# Patient Record
Sex: FEMALE | Race: WHITE | Employment: STUDENT | URBAN - METROPOLITAN AREA
[De-identification: names, ages, dates, MRNs, and addresses within clinical notes are randomized per-mention and may not be internally consistent; named-entity substitution may affect disease eponyms.]

---

## 2020-02-21 ENCOUNTER — HOSPITAL ENCOUNTER (EMERGENCY)
Age: 25
Discharge: HOME | End: 2020-02-21
Attending: EMERGENCY MEDICINE | Admitting: EMERGENCY MEDICINE
Payer: COMMERCIAL

## 2020-02-21 VITALS
WEIGHT: 181 LBS | HEIGHT: 67 IN | TEMPERATURE: 98.3 F | RESPIRATION RATE: 15 BRPM | BODY MASS INDEX: 28.41 KG/M2 | SYSTOLIC BLOOD PRESSURE: 111 MMHG | DIASTOLIC BLOOD PRESSURE: 84 MMHG | OXYGEN SATURATION: 99 % | HEART RATE: 118 BPM

## 2020-02-21 DIAGNOSIS — K52.9 GASTROENTERITIS, ACUTE: Primary | ICD-10-CM

## 2020-02-21 LAB
ANION GAP SERPL CALC-SCNC: 7 MMOL/L (ref 5–15)
BUN SERPL-MCNC: 14 MG/DL (ref 6–20)
BUN/CREAT SERPL: 16 (ref 12–20)
CALCIUM SERPL-MCNC: 9.5 MG/DL (ref 8.5–10.1)
CHLORIDE SERPL-SCNC: 108 MMOL/L (ref 97–108)
CO2 SERPL-SCNC: 22 MMOL/L (ref 21–32)
CREAT SERPL-MCNC: 0.9 MG/DL (ref 0.55–1.02)
GLUCOSE SERPL-MCNC: 115 MG/DL (ref 65–100)
HCG UR QL: NEGATIVE
POTASSIUM SERPL-SCNC: 4 MMOL/L (ref 3.5–5.1)
SODIUM SERPL-SCNC: 137 MMOL/L (ref 136–145)

## 2020-02-21 PROCEDURE — 96375 TX/PRO/DX INJ NEW DRUG ADDON: CPT

## 2020-02-21 PROCEDURE — 96361 HYDRATE IV INFUSION ADD-ON: CPT

## 2020-02-21 PROCEDURE — 96374 THER/PROPH/DIAG INJ IV PUSH: CPT

## 2020-02-21 PROCEDURE — 81025 URINE PREGNANCY TEST: CPT

## 2020-02-21 PROCEDURE — 99284 EMERGENCY DEPT VISIT MOD MDM: CPT

## 2020-02-21 PROCEDURE — 36415 COLL VENOUS BLD VENIPUNCTURE: CPT

## 2020-02-21 PROCEDURE — 80048 BASIC METABOLIC PNL TOTAL CA: CPT

## 2020-02-21 PROCEDURE — 74011250636 HC RX REV CODE- 250/636: Performed by: EMERGENCY MEDICINE

## 2020-02-21 RX ORDER — DICYCLOMINE HYDROCHLORIDE 10 MG/1
10 CAPSULE ORAL 4 TIMES DAILY
Qty: 20 CAP | Refills: 0 | Status: SHIPPED | OUTPATIENT
Start: 2020-02-21 | End: 2020-02-21

## 2020-02-21 RX ORDER — ONDANSETRON 4 MG/1
4 TABLET, FILM COATED ORAL
Qty: 20 TAB | Refills: 0 | Status: SHIPPED | OUTPATIENT
Start: 2020-02-21

## 2020-02-21 RX ORDER — ONDANSETRON 2 MG/ML
4 INJECTION INTRAMUSCULAR; INTRAVENOUS
Status: COMPLETED | OUTPATIENT
Start: 2020-02-21 | End: 2020-02-21

## 2020-02-21 RX ORDER — DICYCLOMINE HYDROCHLORIDE 10 MG/1
10 CAPSULE ORAL 4 TIMES DAILY
Qty: 20 CAP | Refills: 0 | Status: SHIPPED | OUTPATIENT
Start: 2020-02-21 | End: 2020-02-26

## 2020-02-21 RX ORDER — KETOROLAC TROMETHAMINE 30 MG/ML
15 INJECTION, SOLUTION INTRAMUSCULAR; INTRAVENOUS
Status: COMPLETED | OUTPATIENT
Start: 2020-02-21 | End: 2020-02-21

## 2020-02-21 RX ORDER — ONDANSETRON 4 MG/1
4 TABLET, FILM COATED ORAL
Qty: 20 TAB | Refills: 0 | Status: SHIPPED | OUTPATIENT
Start: 2020-02-21 | End: 2020-02-21

## 2020-02-21 RX ADMIN — ONDANSETRON 4 MG: 2 INJECTION INTRAMUSCULAR; INTRAVENOUS at 05:33

## 2020-02-21 RX ADMIN — KETOROLAC TROMETHAMINE 15 MG: 30 INJECTION, SOLUTION INTRAMUSCULAR at 05:55

## 2020-02-21 RX ADMIN — SODIUM CHLORIDE 1000 ML: 900 INJECTION, SOLUTION INTRAVENOUS at 05:31

## 2020-02-21 NOTE — ED NOTES
Pt discharged home with written and verbal instructions given by Dr. Griselda Marshall andpt ambulated out of ED without difficulty.

## 2020-02-21 NOTE — DISCHARGE INSTRUCTIONS
Patient Education        Gastroenteritis: Care Instructions  Your Care Instructions    Gastroenteritis is an illness that may cause nausea, vomiting, and diarrhea. It is sometimes called \"stomach flu. \" It can be caused by bacteria or a virus. You will probably begin to feel better in 1 to 2 days. In the meantime, get plenty of rest and make sure you do not become dehydrated. Dehydration occurs when your body loses too much fluid. Follow-up care is a key part of your treatment and safety. Be sure to make and go to all appointments, and call your doctor if you are having problems. It's also a good idea to know your test results and keep a list of the medicines you take. How can you care for yourself at home? · If your doctor prescribed antibiotics, take them as directed. Do not stop taking them just because you feel better. You need to take the full course of antibiotics. · Drink plenty of fluids to prevent dehydration, enough so that your urine is light yellow or clear like water. Choose water and other caffeine-free clear liquids until you feel better. If you have kidney, heart, or liver disease and have to limit fluids, talk with your doctor before you increase your fluid intake. · Drink fluids slowly, in frequent, small amounts, because drinking too much too fast can cause vomiting. · Begin eating mild foods, such as dry toast, yogurt, applesauce, bananas, and rice. Avoid spicy, hot, or high-fat foods, and do not drink alcohol or caffeine for a day or two. Do not drink milk or eat ice cream until you are feeling better. How to prevent gastroenteritis  · Keep hot foods hot and cold foods cold. · Do not eat meats, dressings, salads, or other foods that have been kept at room temperature for more than 2 hours. · Use a thermometer to check your refrigerator. It should be between 34°F and 40°F.  · Defrost meats in the refrigerator or microwave, not on the kitchen counter.   · Keep your hands and your kitchen clean. Wash your hands, cutting boards, and countertops with hot soapy water frequently. · Cook meat until it is well done. · Do not eat raw eggs or uncooked sauces made with raw eggs. · Do not take chances. If food looks or tastes spoiled, throw it out. When should you call for help? Call 911 anytime you think you may need emergency care. For example, call if:    · You vomit blood or what looks like coffee grounds.     · You passed out (lost consciousness).     · You pass maroon or very bloody stools.    Call your doctor now or seek immediate medical care if:    · You have severe belly pain.     · You have signs of needing more fluids. You have sunken eyes, a dry mouth, and pass only a little dark urine.     · You feel like you are going to faint.     · You have increased belly pain that does not go away in 1 to 2 days.     · You have new or increased nausea, or you are vomiting.     · You have a new or higher fever.     · Your stools are black and tarlike or have streaks of blood.    Watch closely for changes in your health, and be sure to contact your doctor if:    · You are dizzy or lightheaded.     · You urinate less than usual, or your urine is dark yellow or brown.     · You do not feel better with each day that goes by. Where can you learn more? Go to http://madison-yan.info/. Enter N142 in the search box to learn more about \"Gastroenteritis: Care Instructions. \"  Current as of: June 9, 2019  Content Version: 12.2  © 4141-6266 [employer]. Care instructions adapted under license by [employer] (which disclaims liability or warranty for this information). If you have questions about a medical condition or this instruction, always ask your healthcare professional. [place] [address] any warranty or liability for your use of this information.

## 2020-02-21 NOTE — ED PROVIDER NOTES
EMERGENCY DEPARTMENT HISTORY AND PHYSICAL EXAM      Date: 2/21/2020  Patient Name: Maricarmen Dunn  Patient Age and Sex: 22 y.o. female     History of Presenting Illness     Chief Complaint   Patient presents with    Abdominal Pain     generalized abd pain since N/V/D starting last night around 1700    Vomiting     pt arrives to triage with c/o N/V/D since 1700 without relief in symptoms, pt denies fever chills       History Provided By: Patient    HPI: Maricarmen Dunn is a 60-year-old female presenting with nausea, vomiting and diarrhea. Patient states that her symptoms began at 5 PM yesterday night. States that her  had the same symptoms 3 days ago and its resolved. At 5 PM started with nausea, vomiting, nonbloody diarrhea. States that she is also having sharp and crampy abdominal pain diffusely. Denies any fevers, cough, URI symptoms, urine symptoms. Patient states that she does have an IUD so is less likely pregnant. Denies eating anything out of the ordinary. Had chicken tonight but so did her  and he did fine. There are no other complaints, changes, or physical findings at this time. PCP: Aniyah Majano MD    No current facility-administered medications on file prior to encounter. Current Outpatient Medications on File Prior to Encounter   Medication Sig Dispense Refill    [DISCONTINUED] azithromycin (ZITHROMAX) 250 mg tablet Take 2 tablets today, then take 1 tablet daily 6 Tab 0       Past History     Past Medical History:  Past Medical History:   Diagnosis Date    Chronic headaches        Past Surgical History:  No past surgical history on file. Family History:  No family history on file. Social History:  Social History     Tobacco Use    Smoking status: Never Smoker   Substance Use Topics    Alcohol use: No    Drug use: No       Allergies:  No Known Allergies      Review of Systems   Review of Systems   Constitutional: Negative for chills and fever. Respiratory: Negative for cough and shortness of breath. Cardiovascular: Negative for chest pain. Gastrointestinal: Positive for abdominal pain, diarrhea, nausea and vomiting. Negative for constipation. Neurological: Negative for weakness and numbness. All other systems reviewed and are negative. Physical Exam   Physical Exam  Vitals signs and nursing note reviewed. Constitutional:       Appearance: She is well-developed. HENT:      Head: Normocephalic and atraumatic. Comments: Very dry mucous membranes. Eyes:      Conjunctiva/sclera: Conjunctivae normal.   Neck:      Musculoskeletal: Normal range of motion and neck supple. Cardiovascular:      Rate and Rhythm: Normal rate and regular rhythm. Pulmonary:      Effort: Pulmonary effort is normal. No respiratory distress. Breath sounds: Normal breath sounds. Abdominal:      General: There is no distension. Palpations: Abdomen is soft. Tenderness: There is generalized abdominal tenderness. Musculoskeletal: Normal range of motion. Skin:     General: Skin is warm and dry. Neurological:      Mental Status: She is alert and oriented to person, place, and time.           Diagnostic Study Results     Labs -     Recent Results (from the past 12 hour(s))   METABOLIC PANEL, BASIC    Collection Time: 02/21/20  5:25 AM   Result Value Ref Range    Sodium 137 136 - 145 mmol/L    Potassium 4.0 3.5 - 5.1 mmol/L    Chloride 108 97 - 108 mmol/L    CO2 22 21 - 32 mmol/L    Anion gap 7 5 - 15 mmol/L    Glucose 115 (H) 65 - 100 mg/dL    BUN 14 6 - 20 MG/DL    Creatinine 0.90 0.55 - 1.02 MG/DL    BUN/Creatinine ratio 16 12 - 20      GFR est AA >60 >60 ml/min/1.73m2    GFR est non-AA >60 >60 ml/min/1.73m2    Calcium 9.5 8.5 - 10.1 MG/DL   HCG URINE, QL. - POC    Collection Time: 02/21/20  5:48 AM   Result Value Ref Range    Pregnancy test,urine (POC) NEGATIVE  NEG         Radiologic Studies -   No orders to display     CT Results  (Last 48 hours)    None        CXR Results  (Last 48 hours)    None            Medical Decision Making   I am the first provider for this patient. I reviewed the vital signs, available nursing notes, past medical history, past surgical history, family history and social history. Vital Signs-Reviewed the patient's vital signs. Patient Vitals for the past 12 hrs:   Temp Pulse Resp BP SpO2   02/21/20 0530  (!) 124 16 124/84 98 %   02/21/20 0511 98.3 °F (36.8 °C) (!) 140 18 112/71 98 %       Records Reviewed: Nursing Notes and Old Medical Records    Provider Notes (Medical Decision Making):   Nausea/Vomiting/diarrhea:  Most likely gastroenteritis although gastritis, colitis, IBD, IBS on the differential.  Will treat with antiemetic and IVF to replete losses. Will assess basic labs and electrolytes. Will give pain medication PRN. She is instructed to push clear fluids, small amounts frequently until improving, then advance diet as tolerated. Imodium OTC prn for diarrhea. May use Gatorade for rehydration. May use BRAT diet. Call or office visit prn if symptoms not responding as expected or develops high fever, significant abdominal pain or bloody stool. ED Course:   Initial assessment performed. The patients presenting problems have been discussed, and they are in agreement with the care plan formulated and outlined with them. I have encouraged them to ask questions as they arise throughout their visit. ED Course as of Feb 21 0632 Fri Feb 21, 2020   0608 Patient's labs, pregnancy test, negative. She is already feeling slightly better and her heart rate has improved from 140 to the 120s. Will p.o. challenge her. [JS]      ED Course User Index  [JS] Kayla Gonzales MD     Critical Care Time:   0    Disposition:  Discharge Note:  The patient has been re-evaluated and is ready for discharge. Reviewed available results with patient. Counseled patient on diagnosis and care plan.  Patient has expressed understanding, and all questions have been answered. Patient agrees with plan and agrees to follow up as recommended, or to return to the ED if their symptoms worsen. Discharge instructions have been provided and explained to the patient, along with reasons to return to the ED. PLAN:  Current Discharge Medication List      START taking these medications    Details   ondansetron hcl (ZOFRAN) 4 mg tablet Take 1 Tab by mouth every eight (8) hours as needed for Nausea. Qty: 20 Tab, Refills: 0      dicyclomine (BENTYL) 10 mg capsule Take 1 Cap by mouth four (4) times daily for 5 days. Qty: 20 Cap, Refills: 0         STOP taking these medications       azithromycin (ZITHROMAX) 250 mg tablet Comments:   Reason for Stoppin.   Follow-up Information     Follow up With Specialties Details Why Contact Info    Hugo Bullard MD [facility]  As needed [address] [id number] [phone]          3. Return to ED if worse     Diagnosis     Clinical Impression:   1. Gastroenteritis, acute        Attestations:    Corona Correia M.D. Please note that this dictation was completed with [employer] the [employer] voice recognition software. Quite often unanticipated grammatical, syntax, homophones, and other interpretive errors are inadvertently transcribed by the computer software. Please disregard these errors. Please excuse any errors that have escaped final proofreading. Thank you.